# Patient Record
Sex: FEMALE | Race: WHITE | NOT HISPANIC OR LATINO | Employment: OTHER | URBAN - METROPOLITAN AREA
[De-identification: names, ages, dates, MRNs, and addresses within clinical notes are randomized per-mention and may not be internally consistent; named-entity substitution may affect disease eponyms.]

---

## 2017-06-07 ENCOUNTER — DOCUMENTATION ONLY (OUTPATIENT)
Dept: OTHER | Facility: CLINIC | Age: 67
End: 2017-06-07

## 2017-06-07 DIAGNOSIS — Z71.89 ACP (ADVANCE CARE PLANNING): Chronic | ICD-10-CM

## 2021-08-21 ENCOUNTER — HOSPITAL ENCOUNTER (EMERGENCY)
Facility: CLINIC | Age: 71
Discharge: HOME | End: 2021-08-21
Attending: PHYSICIAN ASSISTANT | Admitting: PHYSICIAN ASSISTANT
Payer: COMMERCIAL

## 2021-08-21 VITALS
TEMPERATURE: 96.7 F | SYSTOLIC BLOOD PRESSURE: 154 MMHG | OXYGEN SATURATION: 99 % | RESPIRATION RATE: 16 BRPM | DIASTOLIC BLOOD PRESSURE: 75 MMHG | HEART RATE: 65 BPM
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
## 2021-08-21 DIAGNOSIS — S60.569A INSECT BITE OF HAND, UNSPECIFIED LATERALITY, INITIAL ENCOUNTER: ICD-10-CM

## 2021-08-21 PROCEDURE — 99283 EMERGENCY DEPT VISIT LOW MDM: CPT

## 2021-08-21 PROCEDURE — 250N000013 HC RX MED GY IP 250 OP 250 PS 637: Performed by: EMERGENCY MEDICINE

## 2021-08-21 RX ORDER — DIPHENHYDRAMINE HCL 25 MG
50 CAPSULE ORAL ONCE
Status: COMPLETED | OUTPATIENT
Start: 2021-08-21 | End: 2021-08-21

## 2021-08-21 RX ORDER — FAMOTIDINE 20 MG/1
20 TABLET, FILM COATED ORAL ONCE
Status: COMPLETED | OUTPATIENT
Start: 2021-08-21 | End: 2021-08-21

## 2021-08-21 RX ADMIN — FAMOTIDINE 20 MG: 20 TABLET ORAL at 19:55

## 2021-08-21 RX ADMIN — DIPHENHYDRAMINE HYDROCHLORIDE 50 MG: 25 CAPSULE ORAL at 19:55

## 2021-08-22 ASSESSMENT — ENCOUNTER SYMPTOMS: CHEST TIGHTNESS: 1

## 2021-08-22 NOTE — ED TRIAGE NOTES
"Pt stung x 2 by bees 30 min pta.  Small amounts of localized erythema to B hands.  Pt states she feels \"tight\" in her chest.   Hx/o asthma.  Resp even and unlabored in triage.    "

## 2021-08-22 NOTE — ED PROVIDER NOTES
History     Chief Complaint:  Insect Bite and Allergic Reaction       HPI   Beena De Souza is a 71 year old female who presents to the ED for evaluation following an insect bite. The patient reports that she was gardening earlier today when a bee stung her right hand through a gardening glove. Immediately thereafter, another bee stung her left hand through her other gardening glove. She states that she took her gloves off. Some time later, she then noticed persistent swelling to her left hand, prompting presentation to the ED. Upon arrival to the ED, patient did have an episode where she felt tight in the chest, although she notes that she has asthma and this does occur. This has resolved on my evaluation.    Allergies:  Fluticasone     Medications:    Zoloft  Trazodone  Effexor  Flexeril  Albuterol  Lipitor    Past Medical History:    Asthma  Adjustment disorder     Past Surgical History:    C section  Breast biopsy     Social History:  Here with     PCP: [facility], [facility]     Review of Systems   Respiratory: Positive for chest tightness.    Skin: Positive for rash.   All other systems reviewed and are negative.        Physical Exam     Patient Vitals for the past 24 hrs:   BP Temp Pulse Resp SpO2   08/21/21 1950 (!) 154/75 (!) 96.7  F (35.9  C) 65 16 99 %        Physical Exam  Constitutional: Pleasant. Cooperative.  Eyes: Pupils equally round  HENT: Head is normal in appearance. Oropharynx is normal with moist mucus membranes.  Cardiovascular: Regular rate and rhythm without murmurs.  Respiratory: Normal respiratory effort, lungs clear to auscultation  Musculoskeletal: Full ROM of bilateral upper extremities. <2 sec cap refill throughout.  Skin: Erythematous papule noted to dorsum of R metacarpal. Erythematous papule noted to dorsum of left hand with trace surrounding edema.  Neurologic: Cranial nerves grossly intact, normal cognition, no apparent deficits. Sensation intact to bilateral  upper extremities.  Psychiatric: Normal affect.  Nursing notes and vital signs reviewed.    Emergency Department Course     Interventions:  Medications   diphenhydrAMINE (BENADRYL) capsule 50 mg (50 mg Oral Given 8/21/21 1955)   famotidine (PEPCID) tablet 20 mg (20 mg Oral Given 8/21/21 1955)        Emergency Department Course:  Patient provided the above medications prior to my evaluation.  Past medical records, nursing notes, and vitals reviewed.  I performed an exam of the patient and obtained history, as documented above.  Patient discharged to home.    Impression & Plan      Medical Decision Making:  Beena De Souza is a 71 year old female who presents to the ED for evaluation following two bee stings. No history of anaphylaxis. Patient is concerned given mild edema surrounding one of the sting sites. Patient did have episode of chest tightness upon arrival to the ED, however this resolved spontaneously without intervention. See HPI as above for additional details. Vitals and physical exam as above. No evidence for cellulitis. Discussed with patient that mild edema and tenderness surrounding bee sting is normal. Stinger may still be present, however there was no evidence for this, and we discussed that this would fall out spontaneously in the near future. In the meantime, she can use benadryl, ice for symptomatic relief. No concern for anaphylaxis at this time. Patient reassured. [place] patient was safe for discharge to home. Discussed reasons to return. All questions answered. Patient discharged to home in stable condition.    Diagnosis:    ICD-10-CM    1. Insect bite of hand, unspecified laterality, initial encounter  S60.569A     W57.XXXA         Discharge Medications:     Medication List      There are no discharge medications for this visit.          This record was created at least in part using electronic voice recognition software, so please excuse any typographical errors.         Blair Wilson,  VEGA  08/22/21 0216

## 2021-08-22 NOTE — DISCHARGE INSTRUCTIONS
Use Benadryl as per the directions on the packaging for symptomatic relief of any itching.  You may apply ice to the region to pain relief, as well as Tylenol.  Watch the area closely for any spreading redness around the wounds, streaking redness up the arms, or fevers.

## 2022-07-19 ENCOUNTER — PATIENT OUTREACH (OUTPATIENT)
Dept: ONCOLOGY | Facility: CLINIC | Age: 72
End: 2022-07-19

## 2022-07-19 ENCOUNTER — TRANSCRIBE ORDERS (OUTPATIENT)
Dept: OTHER | Age: 72
End: 2022-07-19

## 2022-07-19 DIAGNOSIS — D64.9 LOW HEMOGLOBIN: Primary | ICD-10-CM

## 2022-07-19 NOTE — PROGRESS NOTES
Writer received referral for bicytopenia. Reviewed for urgency based on labs and symptomology. Appropriate scheduling instructions added and referral sent to New Patient Scheduling for completion.

## 2022-09-14 ENCOUNTER — PRE VISIT (OUTPATIENT)
Dept: ONCOLOGY | Facility: CLINIC | Age: 72
End: 2022-09-14

## 2022-09-19 NOTE — PROGRESS NOTES
[facility]    Hematology/Oncology New Patient Note      Today's Date: 22    Reason for Consultation: Low hemoglobin  Referring Provider: Esteban Mejia MD, Tim Osman MD      HISTORY OF PRESENT ILLNESS: Beena De Souza is a  postemenopausal 72 year old female who presents with iron deficiency anemia. Past medical history is significant for HLD, asthma, and IBS.    Patient has been donating blood once monthly for a long while. She was then told she had elevated platelets and began to donate platelets only. Finally, she states she was told she was anemic and was no longer able to donate blood products.  Last blood donation was approximately 4 months ago.     She had noted decreased strength and energy. Workup in 2022 had returned with ferritin 13 and hemoglobin 11.9.    Patient denies evidence of gross bleed in urine, stools, or vaginal bleeding. She denies early satiety, abdominal bloating/pain.     She has been on oral iron once daily since July and has noted increased energy levels.     No personal history of malignancy. No FamHx of malignancy.    She has remote social smoking history in her 30s. No alcohol or illicit drug use. She is retired and lives at home with her  and puppy and is able to perform all ADLs without issue.    Cancer screening: Colonoscopies UTD, last 2-3 years. She has had polyp removal. Mammograms are UTD; she had a right breast biopsy >10 years, benign. Pap smears UTD- no abnormals.       REVIEW OF SYSTEMS:   A 14 point ROS was reviewed with pertinent positives and negatives in the HPI.        HOME MEDICATIONS:  No current outpatient medications on file.         ALLERGIES:  Allergies   Allergen Reactions     Fluticasone Rash         PAST MEDICAL HISTORY:  Past medical history is significant for HLD, asthma, and IBS.      PAST SURGICAL HISTORY:  Colonoscopies, breast biopsy.    SOCIAL HISTORY:  Social History     Socioeconomic History      "Marital status:      Spouse name: Not on file     Number of children: Not on file     Years of education: Not on file     Highest education level: Not on file   Occupational History     Not on file   Tobacco Use     Smoking status: Not on file     Smokeless tobacco: Not on file   Substance and Sexual Activity     Alcohol use: Not on file     Drug use: Not on file     Sexual activity: Not on file   Other Topics Concern     Not on file   Social History Narrative     Not on file     Social Determinants of Health     Financial Resource Strain: Not on file   Food Insecurity: Not on file   Transportation Needs: Not on file   Physical Activity: Not on file   Stress: Not on file   Social Connections: Not on file   Intimate Partner Violence: Not on file   Housing Stability: Not on file         FAMILY HISTORY:  Father: CAD, CVA.  No malignancy.    PHYSICAL EXAM:  Vital signs:  /63   Pulse 80   Temp 97.2  F (36.2  C) (Tympanic)   Resp 16   Ht 1.613 m (5' 3.5\")   Wt 53.4 kg (117 lb 12.8 oz)   SpO2 99%   BMI 20.54 kg/m     ECO  GENERAL/CONSTITUTIONAL: No acute distress. Thin.  EYES: Pupils are equal, round, and react to light and accommodation. Extraocular movements intact.  No scleral icterus.  ENT/MOUTH: Neck supple. Oropharynx clear, no mucositis.  LYMPH: No anterior cervical, posterior cervical, supraclavicular, axillary or inguinal adenopathy.   RESPIRATORY: Clear to auscultation bilaterally. No crackles or wheezing.   CARDIOVASCULAR: Regular rate and rhythm without murmurs, gallops, or rubs.  GASTROINTESTINAL: No hepatosplenomegaly, masses, or tenderness. The patient has normal bowel sounds. No guarding.  No distention.  MUSCULOSKELETAL: Warm and well-perfused, no cyanosis, clubbing, or edema.  NEUROLOGIC: Cranial nerves II-XII are intact. Alert, oriented, answers questions appropriately.  INTEGUMENTARY: No rashes or jaundice.  GAIT: Steady, does not use assistive device.      LABS:  ANEMIA - IRON " STUDIES  Component 2022        Iron 46 Low     --   TIBC -- --   % Saturation, calc. -- --   TIBC, Calculated 403 --   Transferrin 322 --   Ferritin -- 13   Vitamin B12 -- --   Folate -- --   % Saturation, Calculated 11 --   TIBC Interpretation Low iron, normal TIBC, possible iron deficiency. --       CHEM COMMON  Component 2022       Hours Fasting N/A   Creatinine 1.11 High       GFR, Estimated 54 Low       GFR, Est., If Black --   BUN 15   Sodium 140   Potassium 5.1   Chloride 106   CO2 29   Calcium 9.4   Anion Gap (calc.) --   Anion Gap 5 Low             HEME BLOOD  Component 2022       WBC 4.3   RBC 4.85   Hemoglobin 11.9 Low       HCT 38.0   MCV 78.4 Low       MCH 24.5 Low       MCHC 31.3 Low       RDW 18.3 High       Platelets 140 Low       ESR --   PMN/Band --   Lymph --   Mono --   Eos --   Baso --   Neutrophil Absolute 2.7   Lymph Absolute --   Mono  Absolute --   Eos   Absolute --   Baso  Absolute --   Retic, Automated --   Immature Gran --   Imm Gran Absolute --   Lymphocyte Absolute 1.1   Monocytes Absolute 0.3   Eosinophil Absolute 0.2   Basophil Absolute 0.0   Immature Gran % 0.0       HEPATITIS  Component 2022       Hepatitis C Antibody Negative (Non Reactive)     LIVER STUDIES  Component 2022       AST (SGOT) 24   ALT (SGPT) 18   Alkaline Phosphatase 56   Bilirubin, Total 0.4   Bilirubin, Direct --       PROTEINS  Component 2022       Protein, Total 6.7   Albumin 4.0         ASSESSMENT/PLAN:  Beena De Souza is a  postemenopausal 72 year old female who presents with iron deficiency anemia. Past medical history is significant for HLD, asthma, and IBS.    1) Iron deficiency anemia  -Patient denies gross evidence of bleed. She reports colonoscopy is UTD and last done 2-3 years ago.   -She previously was donating blood once monthly. I discussed with patient it sounds as if she was beginning to show symptoms of iron deficiency anemia with reactive  thrombocytosis and then subsequent laboratory evidence of anemia in July 2022.  -She continues on once daily iron tablet as well as VitC. Okay to continue. Discussed it can take 6-9 months to replenish stores.  -She is recommended to hold from blood donation at this time.  -Update CBC, CMP, iron stores, B12/folate, LDH, peripheral smear.   -Will see if she requires IV iron.   -Discussed with patient, if she has continued iron deficiency, it would be in her best interests to have repeat GI evaluation.     2) History of HLD, asthma, IBS  -Followed by PCP.     3) Follow up in 3-4 weeks to discuss results above.         Darlene Carpio, DO  Hematology/Oncology  [facility]

## 2022-09-23 ENCOUNTER — ONCOLOGY VISIT (OUTPATIENT)
Dept: ONCOLOGY | Facility: CLINIC | Age: 72
End: 2022-09-23
Attending: INTERNAL MEDICINE
Payer: COMMERCIAL

## 2022-09-23 VITALS
RESPIRATION RATE: 16 BRPM | SYSTOLIC BLOOD PRESSURE: 111 MMHG | WEIGHT: 117.8 LBS | HEART RATE: 80 BPM | BODY MASS INDEX: 20.11 KG/M2 | OXYGEN SATURATION: 99 % | HEIGHT: 64 IN | TEMPERATURE: 97.2 F | DIASTOLIC BLOOD PRESSURE: 63 MMHG

## 2022-09-23 DIAGNOSIS — D50.8 OTHER IRON DEFICIENCY ANEMIA: Primary | ICD-10-CM

## 2022-09-23 DIAGNOSIS — D64.9 LOW HEMOGLOBIN: ICD-10-CM

## 2022-09-23 LAB
ALBUMIN SERPL BCG-MCNC: 4.2 G/DL (ref 3.5–5.2)
ALP SERPL-CCNC: 66 U/L (ref 35–104)
ALT SERPL W P-5'-P-CCNC: 32 U/L (ref 10–35)
ANION GAP SERPL CALCULATED.3IONS-SCNC: 7 MMOL/L (ref 7–15)
AST SERPL W P-5'-P-CCNC: 36 U/L (ref 10–35)
BASOPHILS # BLD AUTO: 0 10E3/UL (ref 0–0.2)
BASOPHILS NFR BLD AUTO: 1 %
BILIRUB SERPL-MCNC: 0.5 MG/DL
BUN SERPL-MCNC: 13.2 MG/DL (ref 8–23)
CALCIUM SERPL-MCNC: 9.6 MG/DL (ref 8.8–10.2)
CHLORIDE SERPL-SCNC: 104 MMOL/L (ref 98–107)
CREAT SERPL-MCNC: 1.11 MG/DL (ref 0.51–0.95)
DEPRECATED HCO3 PLAS-SCNC: 29 MMOL/L (ref 22–29)
EOSINOPHIL # BLD AUTO: 0.5 10E3/UL (ref 0–0.7)
EOSINOPHIL NFR BLD AUTO: 8 %
ERYTHROCYTE [DISTWIDTH] IN BLOOD BY AUTOMATED COUNT: 15.7 % (ref 10–15)
FERRITIN SERPL-MCNC: 58 NG/ML (ref 11–328)
FOLATE SERPL-MCNC: 15.2 NG/ML (ref 4.6–34.8)
GFR SERPL CREATININE-BSD FRML MDRD: 53 ML/MIN/1.73M2
GLUCOSE SERPL-MCNC: 85 MG/DL (ref 70–99)
HCT VFR BLD AUTO: 43.8 % (ref 35–47)
HGB BLD-MCNC: 13.7 G/DL (ref 11.7–15.7)
IMM GRANULOCYTES # BLD: 0 10E3/UL
IMM GRANULOCYTES NFR BLD: 0 %
IRON BINDING CAPACITY (ROCHE): 314 UG/DL (ref 240–430)
IRON SATN MFR SERPL: 33 % (ref 15–46)
IRON SERPL-MCNC: 105 UG/DL (ref 37–145)
LYMPHOCYTES # BLD AUTO: 1.3 10E3/UL (ref 0.8–5.3)
LYMPHOCYTES NFR BLD AUTO: 23 %
MCH RBC QN AUTO: 27.9 PG (ref 26.5–33)
MCHC RBC AUTO-ENTMCNC: 31.3 G/DL (ref 31.5–36.5)
MCV RBC AUTO: 89 FL (ref 78–100)
MONOCYTES # BLD AUTO: 0.3 10E3/UL (ref 0–1.3)
MONOCYTES NFR BLD AUTO: 6 %
NEUTROPHILS # BLD AUTO: 3.4 10E3/UL (ref 1.6–8.3)
NEUTROPHILS NFR BLD AUTO: 62 %
NRBC # BLD AUTO: 0 10E3/UL
NRBC BLD AUTO-RTO: 0 /100
PLATELET # BLD AUTO: 153 10E3/UL (ref 150–450)
POTASSIUM SERPL-SCNC: 4.6 MMOL/L (ref 3.4–5.3)
PROT SERPL-MCNC: 7 G/DL (ref 6.4–8.3)
RBC # BLD AUTO: 4.91 10E6/UL (ref 3.8–5.2)
RETICS # AUTO: 0.05 10E6/UL (ref 0.03–0.1)
RETICS/RBC NFR AUTO: 0.9 % (ref 0.5–2)
SODIUM SERPL-SCNC: 140 MMOL/L (ref 136–145)
TSH SERPL DL<=0.005 MIU/L-ACNC: 2.11 UIU/ML (ref 0.3–4.2)
VIT B12 SERPL-MCNC: 784 PG/ML (ref 232–1245)
WBC # BLD AUTO: 5.5 10E3/UL (ref 4–11)

## 2022-09-23 PROCEDURE — 36415 COLL VENOUS BLD VENIPUNCTURE: CPT | Performed by: INTERNAL MEDICINE

## 2022-09-23 PROCEDURE — 84443 ASSAY THYROID STIM HORMONE: CPT | Performed by: INTERNAL MEDICINE

## 2022-09-23 PROCEDURE — 82746 ASSAY OF FOLIC ACID SERUM: CPT | Performed by: INTERNAL MEDICINE

## 2022-09-23 PROCEDURE — 82607 VITAMIN B-12: CPT | Performed by: INTERNAL MEDICINE

## 2022-09-23 PROCEDURE — 82728 ASSAY OF FERRITIN: CPT | Performed by: INTERNAL MEDICINE

## 2022-09-23 PROCEDURE — 83550 IRON BINDING TEST: CPT | Performed by: INTERNAL MEDICINE

## 2022-09-23 PROCEDURE — G0463 HOSPITAL OUTPT CLINIC VISIT: HCPCS | Mod: 25

## 2022-09-23 PROCEDURE — 85045 AUTOMATED RETICULOCYTE COUNT: CPT | Performed by: INTERNAL MEDICINE

## 2022-09-23 PROCEDURE — 99203 OFFICE O/P NEW LOW 30 MIN: CPT | Performed by: INTERNAL MEDICINE

## 2022-09-23 PROCEDURE — 85025 COMPLETE CBC W/AUTO DIFF WBC: CPT | Performed by: INTERNAL MEDICINE

## 2022-09-23 PROCEDURE — 80053 COMPREHEN METABOLIC PANEL: CPT | Performed by: INTERNAL MEDICINE

## 2022-09-23 RX ORDER — FLUOXETINE 10 MG/1
20 TABLET, FILM COATED ORAL DAILY
COMMUNITY
Start: 2022-09-03

## 2022-09-23 RX ORDER — ATORVASTATIN CALCIUM 10 MG/1
10 TABLET, FILM COATED ORAL DAILY
COMMUNITY
Start: 2022-08-26

## 2022-09-23 RX ORDER — TRAZODONE HYDROCHLORIDE 50 MG/1
TABLET, FILM COATED ORAL
COMMUNITY
Start: 2022-09-21

## 2022-09-23 RX ORDER — BUSPIRONE HYDROCHLORIDE 10 MG/1
TABLET ORAL
COMMUNITY
Start: 2022-09-21

## 2022-09-23 ASSESSMENT — PAIN SCALES - GENERAL: PAINLEVEL: NO PAIN (0)

## 2022-09-23 NOTE — LETTER
2022         RE: Beena De Souza  [address] [address] [address]        Dear Colleague,    Thank you for referring your patient, Beena De Souza, to the [facility]. Please see a copy of my visit note below.    [facility]    Hematology/Oncology New Patient Note      Today's Date: 22    Reason for Consultation: Low hemoglobin  Referring Provider: Esteban Mejia MD, Tim Osman MD      HISTORY OF PRESENT ILLNESS: Beena De Souza is a  postemenopausal 72 year old female who presents with iron deficiency anemia. Past medical history is significant for HLD, asthma, and IBS.    Patient has been donating blood once monthly for a long while. She was then told she had elevated platelets and began to donate platelets only. Finally, she states she was told she was anemic and was no longer able to donate blood products.  Last blood donation was approximately 4 months ago.     She had noted decreased strength and energy. Workup in 2022 had returned with ferritin 13 and hemoglobin 11.9.    Patient denies evidence of gross bleed in urine, stools, or vaginal bleeding. She denies early satiety, abdominal bloating/pain.     She has been on oral iron once daily since July and has noted increased energy levels.     No personal history of malignancy. No FamHx of malignancy.    She has remote social smoking history in her 30s. No alcohol or illicit drug use. She is retired and lives at home with her  and puppy and is able to perform all ADLs without issue.    Cancer screening: Colonoscopies UTD, last 2-3 years. She has had polyp removal. Mammograms are UTD; she had a right breast biopsy >10 years, benign. Pap smears UTD- no abnormals.       REVIEW OF SYSTEMS:   A 14 point ROS was reviewed with pertinent positives and negatives in the HPI.        HOME MEDICATIONS:  No current outpatient medications on file.         ALLERGIES:  Allergies  "  Allergen Reactions     Fluticasone Rash         PAST MEDICAL HISTORY:  Past medical history is significant for HLD, asthma, and IBS.      PAST SURGICAL HISTORY:  Colonoscopies, breast biopsy.    SOCIAL HISTORY:  Social History     Socioeconomic History     Marital status:      Spouse name: Not on file     Number of children: Not on file     Years of education: Not on file     Highest education level: Not on file   Occupational History     Not on file   Tobacco Use     Smoking status: Not on file     Smokeless tobacco: Not on file   Substance and Sexual Activity     Alcohol use: Not on file     Drug use: Not on file     Sexual activity: Not on file   Other Topics Concern     Not on file   Social History Narrative     Not on file     Social Determinants of Health     Financial Resource Strain: Not on file   Food Insecurity: Not on file   Transportation Needs: Not on file   Physical Activity: Not on file   Stress: Not on file   Social Connections: Not on file   Intimate Partner Violence: Not on file   Housing Stability: Not on file         FAMILY HISTORY:  Father: CAD, CVA.  No malignancy.    PHYSICAL EXAM:  Vital signs:  /63   Pulse 80   Temp 97.2  F (36.2  C) (Tympanic)   Resp 16   Ht 1.613 m (5' 3.5\")   Wt 53.4 kg (117 lb 12.8 oz)   SpO2 99%   BMI 20.54 kg/m     ECO  GENERAL/CONSTITUTIONAL: No acute distress. Thin.  EYES: Pupils are equal, round, and react to light and accommodation. Extraocular movements intact.  No scleral icterus.  ENT/MOUTH: Neck supple. Oropharynx clear, no mucositis.  LYMPH: No anterior cervical, posterior cervical, supraclavicular, axillary or inguinal adenopathy.   RESPIRATORY: Clear to auscultation bilaterally. No crackles or wheezing.   CARDIOVASCULAR: Regular rate and rhythm without murmurs, gallops, or rubs.  GASTROINTESTINAL: No hepatosplenomegaly, masses, or tenderness. The patient has normal bowel sounds. No guarding.  No distention.  MUSCULOSKELETAL: Warm " and well-perfused, no cyanosis, clubbing, or edema.  NEUROLOGIC: Cranial nerves II-XII are intact. Alert, oriented, answers questions appropriately.  INTEGUMENTARY: No rashes or jaundice.  GAIT: Steady, does not use assistive device.      LABS:  ANEMIA - IRON STUDIES  Component 2022        Iron 46 Low     --   TIBC -- --   % Saturation, calc. -- --   TIBC, Calculated 403 --   Transferrin 322 --   Ferritin -- 13   Vitamin B12 -- --   Folate -- --   % Saturation, Calculated 11 --   TIBC Interpretation Low iron, normal TIBC, possible iron deficiency. --       CHEM COMMON  Component 2022       Hours Fasting N/A   Creatinine 1.11 High       GFR, Estimated 54 Low       GFR, Est., If Black --   BUN 15   Sodium 140   Potassium 5.1   Chloride 106   CO2 29   Calcium 9.4   Anion Gap (calc.) --   Anion Gap 5 Low             HEME BLOOD  Component 2022       WBC 4.3   RBC 4.85   Hemoglobin 11.9 Low       HCT 38.0   MCV 78.4 Low       MCH 24.5 Low       MCHC 31.3 Low       RDW 18.3 High       Platelets 140 Low       ESR --   PMN/Band --   Lymph --   Mono --   Eos --   Baso --   Neutrophil Absolute 2.7   Lymph Absolute --   Mono  Absolute --   Eos   Absolute --   Baso  Absolute --   Retic, Automated --   Immature Gran --   Imm Gran Absolute --   Lymphocyte Absolute 1.1   Monocytes Absolute 0.3   Eosinophil Absolute 0.2   Basophil Absolute 0.0   Immature Gran % 0.0       HEPATITIS  Component 2022       Hepatitis C Antibody Negative (Non Reactive)     LIVER STUDIES  Component 2022       AST (SGOT) 24   ALT (SGPT) 18   Alkaline Phosphatase 56   Bilirubin, Total 0.4   Bilirubin, Direct --       PROTEINS  Component 2022       Protein, Total 6.7   Albumin 4.0         ASSESSMENT/PLAN:  Beena De Souza is a  postemenopausal 72 year old female who presents with iron deficiency anemia. Past medical history is significant for HLD, asthma, and IBS.    1) Iron deficiency anemia  -Patient denies  gross evidence of bleed. She reports colonoscopy is UTD and last done 2-3 years ago.   -She previously was donating blood once monthly. I discussed with patient it sounds as if she was beginning to show symptoms of iron deficiency anemia with reactive thrombocytosis and then subsequent laboratory evidence of anemia in July 2022.  -She continues on once daily iron tablet as well as VitC. Okay to continue. Discussed it can take 6-9 months to replenish stores.  -She is recommended to hold from blood donation at this time.  -Update CBC, CMP, iron stores, B12/folate, LDH, peripheral smear.   -Will see if she requires IV iron.   -Discussed with patient, if she has continued iron deficiency, it would be in her best interests to have repeat GI evaluation.     2) History of HLD, asthma, IBS  -Followed by PCP.     3) Follow up in 3-4 weeks to discuss results above.         Darlene Carpio DO  Hematology/Oncology  [facility]        Again, thank you for allowing me to participate in the care of your patient.        Sincerely,        Darlene Carpio DO

## 2022-09-23 NOTE — LETTER
2022         RE: Beena De Souza  [id number] [facility] [id number]      [facility]    Hematology/Oncology New Patient Note      Today's Date: 22    Reason for Consultation: Low hemoglobin  Referring Provider: Esteban Mejia MD, Tim Osman MD      HISTORY OF PRESENT ILLNESS: Beena De Souza is a  postemenopausal 72 year old female who presents with iron deficiency anemia. Past medical history is significant for HLD, asthma, and IBS.    Patient has been donating blood once monthly for a long while. She was then told she had elevated platelets and began to donate platelets only. Finally, she states she was told she was anemic and was no longer able to donate blood products.  Last blood donation was approximately 4 months ago.     She had noted decreased strength and energy. Workup in 2022 had returned with ferritin 13 and hemoglobin 11.9.    Patient denies evidence of gross bleed in urine, stools, or vaginal bleeding. She denies early satiety, abdominal bloating/pain.     She has been on oral iron once daily since July and has noted increased energy levels.     No personal history of malignancy. No FamHx of malignancy.    She has remote social smoking history in her 30s. No alcohol or illicit drug use. She is retired and lives at home with her  and puppy and is able to perform all ADLs without issue.    Cancer screening: Colonoscopies UTD, last 2-3 years. She has had polyp removal. Mammograms are UTD; she had a right breast biopsy >10 years, benign. Pap smears UTD- no abnormals.       REVIEW OF SYSTEMS:   A 14 point ROS was reviewed with pertinent positives and negatives in the HPI.        HOME MEDICATIONS:  No current outpatient medications on file.         ALLERGIES:  Allergies   Allergen Reactions     Fluticasone Rash         PAST MEDICAL HISTORY:  Past medical history is significant for HLD, asthma, and IBS.      PAST SURGICAL HISTORY:  Colonoscopies,  "breast biopsy.    SOCIAL HISTORY:  Social History     Socioeconomic History     Marital status:      Spouse name: Not on file     Number of children: Not on file     Years of education: Not on file     Highest education level: Not on file   Occupational History     Not on file   Tobacco Use     Smoking status: Not on file     Smokeless tobacco: Not on file   Substance and Sexual Activity     Alcohol use: Not on file     Drug use: Not on file     Sexual activity: Not on file   Other Topics Concern     Not on file   Social History Narrative     Not on file     Social Determinants of Health     Financial Resource Strain: Not on file   Food Insecurity: Not on file   Transportation Needs: Not on file   Physical Activity: Not on file   Stress: Not on file   Social Connections: Not on file   Intimate Partner Violence: Not on file   Housing Stability: Not on file         FAMILY HISTORY:  Father: CAD, CVA.  No malignancy.    PHYSICAL EXAM:  Vital signs:  /63   Pulse 80   Temp 97.2  F (36.2  C) (Tympanic)   Resp 16   Ht 1.613 m (5' 3.5\")   Wt 53.4 kg (117 lb 12.8 oz)   SpO2 99%   BMI 20.54 kg/m     ECO  GENERAL/CONSTITUTIONAL: No acute distress. Thin.  EYES: Pupils are equal, round, and react to light and accommodation. Extraocular movements intact.  No scleral icterus.  ENT/MOUTH: Neck supple. Oropharynx clear, no mucositis.  LYMPH: No anterior cervical, posterior cervical, supraclavicular, axillary or inguinal adenopathy.   RESPIRATORY: Clear to auscultation bilaterally. No crackles or wheezing.   CARDIOVASCULAR: Regular rate and rhythm without murmurs, gallops, or rubs.  GASTROINTESTINAL: No hepatosplenomegaly, masses, or tenderness. The patient has normal bowel sounds. No guarding.  No distention.  MUSCULOSKELETAL: Warm and well-perfused, no cyanosis, clubbing, or edema.  NEUROLOGIC: Cranial nerves II-XII are intact. Alert, oriented, answers questions appropriately.  INTEGUMENTARY: No rashes or " jaundice.  GAIT: Steady, does not use assistive device.      LABS:  ANEMIA - IRON STUDIES  Component 2022        Iron 46 Low     --   TIBC -- --   % Saturation, calc. -- --   TIBC, Calculated 403 --   Transferrin 322 --   Ferritin -- 13   Vitamin B12 -- --   Folate -- --   % Saturation, Calculated 11 --   TIBC Interpretation Low iron, normal TIBC, possible iron deficiency. --       CHEM COMMON  Component 2022       Hours Fasting N/A   Creatinine 1.11 High       GFR, Estimated 54 Low       GFR, Est., If Black --   BUN 15   Sodium 140   Potassium 5.1   Chloride 106   CO2 29   Calcium 9.4   Anion Gap (calc.) --   Anion Gap 5 Low             HEME BLOOD  Component 2022       WBC 4.3   RBC 4.85   Hemoglobin 11.9 Low       HCT 38.0   MCV 78.4 Low       MCH 24.5 Low       MCHC 31.3 Low       RDW 18.3 High       Platelets 140 Low       ESR --   PMN/Band --   Lymph --   Mono --   Eos --   Baso --   Neutrophil Absolute 2.7   Lymph Absolute --   Mono  Absolute --   Eos   Absolute --   Baso  Absolute --   Retic, Automated --   Immature Gran --   Imm Gran Absolute --   Lymphocyte Absolute 1.1   Monocytes Absolute 0.3   Eosinophil Absolute 0.2   Basophil Absolute 0.0   Immature Gran % 0.0       HEPATITIS  Component 2022       Hepatitis C Antibody Negative (Non Reactive)     LIVER STUDIES  Component 2022       AST (SGOT) 24   ALT (SGPT) 18   Alkaline Phosphatase 56   Bilirubin, Total 0.4   Bilirubin, Direct --       PROTEINS  Component 2022       Protein, Total 6.7   Albumin 4.0         ASSESSMENT/PLAN:  Beena De Souza is a  postemenopausal 72 year old female who presents with iron deficiency anemia. Past medical history is significant for HLD, asthma, and IBS.    1) Iron deficiency anemia  -Patient denies gross evidence of bleed. She reports colonoscopy is UTD and last done 2-3 years ago.   -She previously was donating blood once monthly. I discussed with patient it sounds as if  she was beginning to show symptoms of iron deficiency anemia with reactive thrombocytosis and then subsequent laboratory evidence of anemia in July 2022.  -She continues on once daily iron tablet as well as VitC. Okay to continue. Discussed it can take 6-9 months to replenish stores.  -She is recommended to hold from blood donation at this time.  -Update CBC, CMP, iron stores, B12/folate, LDH, peripheral smear.   -Will see if she requires IV iron.   -Discussed with patient, if she has continued iron deficiency, it would be in her best interests to have repeat GI evaluation.     2) History of HLD, asthma, IBS  -Followed by PCP.     3) Follow up in 3-4 weeks to discuss results above.         Darlene Carpio DO  Hematology/Oncology  [facility]          Darlene Carpio DO

## 2022-09-23 NOTE — PROGRESS NOTES
Medical Assistant Note:  Beena De Souza presents today for blood draw.    Patient seen by provider today: Yes: Dr. Carpio.   present during visit today: Not Applicable.    Concerns: No Concerns.    Procedure:  Labs drawn    Post Assessment:  Labs drawn without difficulty: Yes.    Discharge Plan:  Departure Mode: Ambulatory.    Face to Face Time: 10 min.    Gisel Fish CMA

## 2022-09-23 NOTE — NURSING NOTE
"Oncology Rooming Note    September 23, 2022 9:02 AM   Beena De Souza is a 72 year old female who presents for:    Chief Complaint   Patient presents with     Oncology Clinic Visit     New Patient      Initial Vitals: /63   Pulse 80   Temp 97.2  F (36.2  C) (Tympanic)   Resp 16   Ht 1.613 m (5' 3.5\")   Wt 53.4 kg (117 lb 12.8 oz)   SpO2 99%   BMI 20.54 kg/m   Estimated body mass index is 20.54 kg/m  as calculated from the following:    Height as of this encounter: 1.613 m (5' 3.5\").    Weight as of this encounter: 53.4 kg (117 lb 12.8 oz). Body surface area is 1.55 meters squared.  No Pain (0) Comment: Data Unavailable   No LMP recorded. Patient is postmenopausal.  Allergies reviewed: Yes  Medications reviewed: Yes    Medications: Medication refills not needed today.  Pharmacy name entered into [facility]: [id number] - [place], [place] - [address]    Clinical concerns: New Patient        Torie Garcia CMA              "

## 2022-09-27 LAB
PATH REPORT.COMMENTS IMP SPEC: NORMAL
PATH REPORT.FINAL DX SPEC: NORMAL
PATH REPORT.MICROSCOPIC SPEC OTHER STN: NORMAL
PATH REPORT.MICROSCOPIC SPEC OTHER STN: NORMAL
PATH REPORT.RELEVANT HX SPEC: NORMAL

## 2022-09-27 PROCEDURE — 85060 BLOOD SMEAR INTERPRETATION: CPT | Performed by: PATHOLOGY

## 2022-09-30 NOTE — PATIENT INSTRUCTIONS
Beena is scheduled for a follow up with Dr. Carpio on 10/25/22 at 11:30 am.    Malina Solis RN on 9/30/2022 at 9:18 AM

## 2022-10-24 NOTE — PROGRESS NOTES
[facility]    Hematology/Oncology Established Patient Note      Today's Date: 10/25/22    Reason for Consultation: Low hemoglobin  Referring Provider: Esteban Mejia MD, Tim Osman MD      HISTORY OF PRESENT ILLNESS: Beena De Souza is a  postemenopausal 72 year old female who presents with iron deficiency anemia. Past medical history is significant for HLD, asthma, and IBS.    Patient has been donating blood once monthly for a long while. She was then told she had elevated platelets and began to donate platelets only. Finally, she states she was told she was anemic and was no longer able to donate blood products.  Last blood donation was approximately 2022.     She had noted decreased strength and energy. Workup in 2022 had returned with ferritin 13 and hemoglobin 11.9.    Patient denies evidence of gross bleed in urine, stools, or vaginal bleeding. She denies early satiety, abdominal bloating/pain.     She has been on oral iron once daily since July and has noted increased energy levels.     No personal history of malignancy. No FamHx of malignancy.    She has remote social smoking history in her 30s. No alcohol or illicit drug use. She is retired and lives at home with her  and puppy and is able to perform all ADLs without issue.    Cancer screening: Colonoscopies UTD, last 2-3 years. She has had polyp removal. Mammograms are UTD; she had a right breast biopsy >10 years, benign. Pap smears UTD- no abnormals.       INTERIM HISTORY:        REVIEW OF SYSTEMS:   A 14 point ROS was reviewed with pertinent positives and negatives in the HPI.        HOME MEDICATIONS:  Current Outpatient Medications   Medication Sig Dispense Refill     atorvastatin (LIPITOR) 10 MG tablet Take 10 mg by mouth daily       busPIRone (BUSPAR) 10 MG tablet        FLUoxetine (PROZAC) 10 MG tablet Take 20 mg by mouth daily       traZODone (DESYREL) 50 MG tablet            ALLERGIES:  Allergies    Allergen Reactions     Fluticasone Rash         PAST MEDICAL HISTORY:  Past medical history is significant for HLD, asthma, and IBS.      PAST SURGICAL HISTORY:  Colonoscopies, breast biopsy.    SOCIAL HISTORY:  Social History     Socioeconomic History     Marital status:      Spouse name: Not on file     Number of children: Not on file     Years of education: Not on file     Highest education level: Not on file   Occupational History     Not on file   Tobacco Use     Smoking status: Former     Types: Cigarettes     Smokeless tobacco: Never   Substance and Sexual Activity     Alcohol use: Not on file     Comment: social     Drug use: Not on file     Sexual activity: Not on file   Other Topics Concern     Not on file   Social History Narrative     Not on file     Social Determinants of Health     Financial Resource Strain: Not on file   Food Insecurity: Not on file   Transportation Needs: Not on file   Physical Activity: Not on file   Stress: Not on file   Social Connections: Not on file   Intimate Partner Violence: Not At Risk     Fear of Current or Ex-Partner: No     Emotionally Abused: No     Physically Abused: No     Sexually Abused: No   Housing Stability: Not on file         FAMILY HISTORY:  Father: CAD, CVA.  No malignancy.    PHYSICAL EXAM:  Vital signs:  There were no vitals taken for this visit.   ECO  GENERAL/CONSTITUTIONAL: No acute distress. Thin.  EYES: Pupils are equal, round, and react to light and accommodation. Extraocular movements intact.  No scleral icterus.  ENT/MOUTH: Neck supple. Oropharynx clear, no mucositis.  LYMPH: No anterior cervical, posterior cervical, supraclavicular, axillary or inguinal adenopathy.   RESPIRATORY: Clear to auscultation bilaterally. No crackles or wheezing.   CARDIOVASCULAR: Regular rate and rhythm without murmurs, gallops, or rubs.  GASTROINTESTINAL: No hepatosplenomegaly, masses, or tenderness. The patient has normal bowel sounds. No guarding.  No  distention.  MUSCULOSKELETAL: Warm and well-perfused, no cyanosis, clubbing, or edema.  NEUROLOGIC: Cranial nerves II-XII are intact. Alert, oriented, answers questions appropriately.  INTEGUMENTARY: No rashes or jaundice.  GAIT: Steady, does not use assistive device.      LABS:   Latest Reference Range & Units 09/23/22 09:50   Sodium 136 - 145 mmol/L 140   Potassium 3.4 - 5.3 mmol/L 4.6   Chloride 98 - 107 mmol/L 104   Carbon Dioxide (CO2) 22 - 29 mmol/L 29   Urea Nitrogen 8.0 - 23.0 mg/dL 13.2   Creatinine 0.51 - 0.95 mg/dL 1.11 (H)   GFR Estimate >60 mL/min/1.73m2 53 (L)   Calcium 8.8 - 10.2 mg/dL 9.6   Anion Gap 7 - 15 mmol/L 7   Albumin 3.5 - 5.2 g/dL 4.2   Protein Total 6.4 - 8.3 g/dL 7.0   Alkaline Phosphatase 35 - 104 U/L 66   ALT 10 - 35 U/L 32   AST 10 - 35 U/L 36 (H)   Bilirubin Total <=1.2 mg/dL 0.5   Ferritin 11 - 328 ng/mL 58   Folate 4.6 - 34.8 ng/mL 15.2   Glucose 70 - 99 mg/dL 85   Iron 37 - 145 ug/dL 105   Iron Binding Capacity 240 - 430 ug/dL 314   Iron Sat Index 15 - 46 % 33   TSH 0.30 - 4.20 uIU/mL 2.11   Vitamin B12 232 - 1,245 pg/mL 784   WBC 4.0 - 11.0 10e3/uL 5.5   Hemoglobin 11.7 - 15.7 g/dL 13.7   Hematocrit 35.0 - 47.0 % 43.8   Platelet Count 150 - 450 10e3/uL 153   RBC Count 3.80 - 5.20 10e6/uL 4.91   MCV 78 - 100 fL 89   MCH 26.5 - 33.0 pg 27.9   MCHC 31.5 - 36.5 g/dL 31.3 (L)   RDW 10.0 - 15.0 % 15.7 (H)   % Neutrophils % 62   % Lymphocytes % 23   % Monocytes % 6   % Eosinophils % 8   % Basophils % 1   Absolute Basophils 0.0 - 0.2 10e3/uL 0.0   Absolute Eosinophils 0.0 - 0.7 10e3/uL 0.5   Absolute Immature Granulocytes <=0.4 10e3/uL 0.0   Absolute Lymphocytes 0.8 - 5.3 10e3/uL 1.3   Absolute Monocytes 0.0 - 1.3 10e3/uL 0.3   % Immature Granulocytes % 0   Absolute Neutrophils 1.6 - 8.3 10e3/uL 3.4   Absolute NRBCs 10e3/uL 0.0   NRBCs per 100 WBC <1 /100 0   % Retic 0.5 - 2.0 % 0.9   Absolute Retic 0.025 - 0.095 10e6/uL 0.046       Final Diagnosis 9/23/22:   Peripheral blood:  -- No  morphologic abnormalities.    Electronically signed by Patrick Navas MD on 2022 at 12:51 PM   Clinical Information    Anemia      Peripheral Smear    The red blood cells appear normochromic.  There is no increase in rouleaux formation or polychromasia.  Poikilocytosis appears mild and nonspecific.  Platelets appear predominantly small, well granulated, and lack significant clumping or satellitosis.  Lymphocytes are predominantly small with cytologically mature chromatin and appear overall polymorphous.  Neutrophils contain normal cytoplasmic granulation and unremarkable nuclear morphology.  There is no dysplasia and no circulating blasts are seen.     Findings 3/5/2018:      The perianal and digital rectal examinations were normal.      The colon (entire examined portion) appeared normal.      Retroflexion done in the right colon.      Non-bleeding external and internal hemorrhoids were found during       retroflexion. The hemorrhoids were medium-sized.  Moderate Sedation:      Moderate (conscious) sedation was personally administered by the       endoscopist. The following parameters were monitored: oxygen       saturation, heart rate, blood pressure, and response to care. Total       physician intraservice time was 10 minutes.  Impression:          - The entire examined colon is normal.                      - Retroflexion done in the right colon.                      - Non-bleeding external and internal hemorrhoids.                      - No specimens collected.  Recommendation:      - Repeat colonoscopy in 10 years for screening                       purposes.      ASSESSMENT/PLAN:  Beena De Souza is a  postemenopausal 72 year old female who presents with iron deficiency anemia. Past medical history is significant for HLD, asthma, and IBS.    1) Iron deficiency anemia  -Patient denies gross evidence of bleed. She reports colonoscopy is UTD and last done in 2018 (Dr. Milly Sanchez). Given the fact that  she does have evidence of iron deficiency that responded to oral supplementation, there should be strong consideration for updating GI workup at this time (EGD/colonoscopy). However, hemoglobin has improved to 13.7 and ferritin is up to 58 currently.  -She continues on once daily iron tablet as well as VitC. Okay to continue. Discussed it can take 6-9 months to replenish stores.  -She is recommended to hold from blood donation at this time.    2) History of HLD, asthma, IBS  -Followed by PCP.     3) CKD  -SPIEP testing done at outside institution was negative for M-protein.     4) Follow up in 6 months with repeat CBC and iron stores to monitor for need for IV iron/etc.         Darlene Carpio DO  Hematology/Oncology  [facility]

## 2022-10-25 ENCOUNTER — ONCOLOGY VISIT (OUTPATIENT)
Dept: ONCOLOGY | Facility: CLINIC | Age: 72
End: 2022-10-25
Attending: INTERNAL MEDICINE
Payer: COMMERCIAL

## 2022-10-25 VITALS
SYSTOLIC BLOOD PRESSURE: 99 MMHG | TEMPERATURE: 97.2 F | BODY MASS INDEX: 19.55 KG/M2 | DIASTOLIC BLOOD PRESSURE: 56 MMHG | HEART RATE: 75 BPM | RESPIRATION RATE: 16 BRPM | WEIGHT: 112.1 LBS | OXYGEN SATURATION: 94 %

## 2022-10-25 DIAGNOSIS — D50.8 OTHER IRON DEFICIENCY ANEMIA: ICD-10-CM

## 2022-10-25 PROCEDURE — 99214 OFFICE O/P EST MOD 30 MIN: CPT | Performed by: INTERNAL MEDICINE

## 2022-10-25 PROCEDURE — G0463 HOSPITAL OUTPT CLINIC VISIT: HCPCS

## 2022-10-25 ASSESSMENT — PAIN SCALES - GENERAL: PAINLEVEL: NO PAIN (0)

## 2022-10-25 NOTE — LETTER
10/25/2022         RE: Beena De Souza  [id number] [facility] [address]        Dear Colleague,    Thank you for referring your patient, Beena De Souza, to the [facility]. Please see a copy of my visit note below.    [facility]    Hematology/Oncology Established Patient Note      Today's Date: 10/25/22    Reason for Consultation: Low hemoglobin  Referring Provider: Esteban Mejia MD, Tim Osman MD      HISTORY OF PRESENT ILLNESS: Beena De Souza is a  postemenopausal 72 year old female who presents with iron deficiency anemia. Past medical history is significant for HLD, asthma, and IBS.    Patient has been donating blood once monthly for a long while. She was then told she had elevated platelets and began to donate platelets only. Finally, she states she was told she was anemic and was no longer able to donate blood products.  Last blood donation was approximately 2022.     She had noted decreased strength and energy. Workup in 2022 had returned with ferritin 13 and hemoglobin 11.9.    Patient denies evidence of gross bleed in urine, stools, or vaginal bleeding. She denies early satiety, abdominal bloating/pain.     She has been on oral iron once daily since July and has noted increased energy levels.     No personal history of malignancy. No FamHx of malignancy.    She has remote social smoking history in her 30s. No alcohol or illicit drug use. She is retired and lives at home with her  and puppy and is able to perform all ADLs without issue.    Cancer screening: Colonoscopies UTD, last 2-3 years. She has had polyp removal. Mammograms are UTD; she had a right breast biopsy >10 years, benign. Pap smears UTD- no abnormals.       INTERIM HISTORY:        REVIEW OF SYSTEMS:   A 14 point ROS was reviewed with pertinent positives and negatives in the HPI.        HOME MEDICATIONS:  Current Outpatient Medications   Medication Sig  Dispense Refill     atorvastatin (LIPITOR) 10 MG tablet Take 10 mg by mouth daily       busPIRone (BUSPAR) 10 MG tablet        FLUoxetine (PROZAC) 10 MG tablet Take 20 mg by mouth daily       traZODone (DESYREL) 50 MG tablet            ALLERGIES:  Allergies   Allergen Reactions     Fluticasone Rash         PAST MEDICAL HISTORY:  Past medical history is significant for HLD, asthma, and IBS.      PAST SURGICAL HISTORY:  Colonoscopies, breast biopsy.    SOCIAL HISTORY:  Social History     Socioeconomic History     Marital status:      Spouse name: Not on file     Number of children: Not on file     Years of education: Not on file     Highest education level: Not on file   Occupational History     Not on file   Tobacco Use     Smoking status: Former     Types: Cigarettes     Smokeless tobacco: Never   Substance and Sexual Activity     Alcohol use: Not on file     Comment: social     Drug use: Not on file     Sexual activity: Not on file   Other Topics Concern     Not on file   Social History Narrative     Not on file     Social Determinants of Health     Financial Resource Strain: Not on file   Food Insecurity: Not on file   Transportation Needs: Not on file   Physical Activity: Not on file   Stress: Not on file   Social Connections: Not on file   Intimate Partner Violence: Not At Risk     Fear of Current or Ex-Partner: No     Emotionally Abused: No     Physically Abused: No     Sexually Abused: No   Housing Stability: Not on file         FAMILY HISTORY:  Father: CAD, CVA.  No malignancy.    PHYSICAL EXAM:  Vital signs:  There were no vitals taken for this visit.   ECO  GENERAL/CONSTITUTIONAL: No acute distress. Thin.  EYES: Pupils are equal, round, and react to light and accommodation. Extraocular movements intact.  No scleral icterus.  ENT/MOUTH: Neck supple. Oropharynx clear, no mucositis.  LYMPH: No anterior cervical, posterior cervical, supraclavicular, axillary or inguinal adenopathy.   RESPIRATORY:  Clear to auscultation bilaterally. No crackles or wheezing.   CARDIOVASCULAR: Regular rate and rhythm without murmurs, gallops, or rubs.  GASTROINTESTINAL: No hepatosplenomegaly, masses, or tenderness. The patient has normal bowel sounds. No guarding.  No distention.  MUSCULOSKELETAL: Warm and well-perfused, no cyanosis, clubbing, or edema.  NEUROLOGIC: Cranial nerves II-XII are intact. Alert, oriented, answers questions appropriately.  INTEGUMENTARY: No rashes or jaundice.  GAIT: Steady, does not use assistive device.      LABS:   Latest Reference Range & Units 09/23/22 09:50   Sodium 136 - 145 mmol/L 140   Potassium 3.4 - 5.3 mmol/L 4.6   Chloride 98 - 107 mmol/L 104   Carbon Dioxide (CO2) 22 - 29 mmol/L 29   Urea Nitrogen 8.0 - 23.0 mg/dL 13.2   Creatinine 0.51 - 0.95 mg/dL 1.11 (H)   GFR Estimate >60 mL/min/1.73m2 53 (L)   Calcium 8.8 - 10.2 mg/dL 9.6   Anion Gap 7 - 15 mmol/L 7   Albumin 3.5 - 5.2 g/dL 4.2   Protein Total 6.4 - 8.3 g/dL 7.0   Alkaline Phosphatase 35 - 104 U/L 66   ALT 10 - 35 U/L 32   AST 10 - 35 U/L 36 (H)   Bilirubin Total <=1.2 mg/dL 0.5   Ferritin 11 - 328 ng/mL 58   Folate 4.6 - 34.8 ng/mL 15.2   Glucose 70 - 99 mg/dL 85   Iron 37 - 145 ug/dL 105   Iron Binding Capacity 240 - 430 ug/dL 314   Iron Sat Index 15 - 46 % 33   TSH 0.30 - 4.20 uIU/mL 2.11   Vitamin B12 232 - 1,245 pg/mL 784   WBC 4.0 - 11.0 10e3/uL 5.5   Hemoglobin 11.7 - 15.7 g/dL 13.7   Hematocrit 35.0 - 47.0 % 43.8   Platelet Count 150 - 450 10e3/uL 153   RBC Count 3.80 - 5.20 10e6/uL 4.91   MCV 78 - 100 fL 89   MCH 26.5 - 33.0 pg 27.9   MCHC 31.5 - 36.5 g/dL 31.3 (L)   RDW 10.0 - 15.0 % 15.7 (H)   % Neutrophils % 62   % Lymphocytes % 23   % Monocytes % 6   % Eosinophils % 8   % Basophils % 1   Absolute Basophils 0.0 - 0.2 10e3/uL 0.0   Absolute Eosinophils 0.0 - 0.7 10e3/uL 0.5   Absolute Immature Granulocytes <=0.4 10e3/uL 0.0   Absolute Lymphocytes 0.8 - 5.3 10e3/uL 1.3   Absolute Monocytes 0.0 - 1.3 10e3/uL 0.3   %  Immature Granulocytes % 0   Absolute Neutrophils 1.6 - 8.3 10e3/uL 3.4   Absolute NRBCs 10e3/uL 0.0   NRBCs per 100 WBC <1 /100 0   % Retic 0.5 - 2.0 % 0.9   Absolute Retic 0.025 - 0.095 10e6/uL 0.046       Final Diagnosis 22:   Peripheral blood:  -- No morphologic abnormalities.    Electronically signed by Patrick Navas MD on 2022 at 12:51 PM   Clinical Information    Anemia      Peripheral Smear    The red blood cells appear normochromic.  There is no increase in rouleaux formation or polychromasia.  Poikilocytosis appears mild and nonspecific.  Platelets appear predominantly small, well granulated, and lack significant clumping or satellitosis.  Lymphocytes are predominantly small with cytologically mature chromatin and appear overall polymorphous.  Neutrophils contain normal cytoplasmic granulation and unremarkable nuclear morphology.  There is no dysplasia and no circulating blasts are seen.     Findings 3/5/2018:      The perianal and digital rectal examinations were normal.      The colon (entire examined portion) appeared normal.      Retroflexion done in the right colon.      Non-bleeding external and internal hemorrhoids were found during       retroflexion. The hemorrhoids were medium-sized.  Moderate Sedation:      Moderate (conscious) sedation was personally administered by the       endoscopist. The following parameters were monitored: oxygen       saturation, heart rate, blood pressure, and response to care. Total       physician intraservice time was 10 minutes.  Impression:          - The entire examined colon is normal.                      - Retroflexion done in the right colon.                      - Non-bleeding external and internal hemorrhoids.                      - No specimens collected.  Recommendation:      - Repeat colonoscopy in 10 years for screening                       purposes.      ASSESSMENT/PLAN:  Beena De Souza is a  postemenopausal 72 year old female who  presents with iron deficiency anemia. Past medical history is significant for HLD, asthma, and IBS.    1) Iron deficiency anemia  -Patient denies gross evidence of bleed. She reports colonoscopy is UTD and last done in 2018 (Dr. Milly Sanchez). Given the fact that she does have evidence of iron deficiency that responded to oral supplementation, there should be strong consideration for updating GI workup at this time (EGD/colonoscopy). However, hemoglobin has improved to 13.7 and ferritin is up to 58 currently.  -She continues on once daily iron tablet as well as VitC. Okay to continue. Discussed it can take 6-9 months to replenish stores.  -She is recommended to hold from blood donation at this time.    2) History of HLD, asthma, IBS  -Followed by PCP.     3) CKD  -SPIEP testing done at outside institution was negative for M-protein.     4) Follow up in 6 months with repeat CBC and iron stores to monitor for need for IV iron/etc.         Darlene Carpio DO  Hematology/Oncology  [facility]        Again, thank you for allowing me to participate in the care of your patient.        Sincerely,        Darlene Carpio DO

## 2022-11-01 NOTE — PATIENT INSTRUCTIONS
All appointments scheduled per Dr. Carpio's checkout note.    Malina Solis RN on 11/1/2022 at 2:51 PM

## 2023-04-14 ENCOUNTER — LAB (OUTPATIENT)
Dept: ONCOLOGY | Facility: CLINIC | Age: 73
End: 2023-04-14
Attending: INTERNAL MEDICINE
Payer: COMMERCIAL

## 2023-04-14 DIAGNOSIS — D50.8 OTHER IRON DEFICIENCY ANEMIA: ICD-10-CM

## 2023-04-14 LAB
BASOPHILS # BLD AUTO: 0.1 10E3/UL (ref 0–0.2)
BASOPHILS NFR BLD AUTO: 1 %
EOSINOPHIL # BLD AUTO: 0.2 10E3/UL (ref 0–0.7)
EOSINOPHIL NFR BLD AUTO: 3 %
ERYTHROCYTE [DISTWIDTH] IN BLOOD BY AUTOMATED COUNT: 13.1 % (ref 10–15)
FERRITIN SERPL-MCNC: 82 NG/ML (ref 11–328)
HCT VFR BLD AUTO: 42.7 % (ref 35–47)
HGB BLD-MCNC: 14 G/DL (ref 11.7–15.7)
IMM GRANULOCYTES # BLD: 0 10E3/UL
IMM GRANULOCYTES NFR BLD: 0 %
IRON BINDING CAPACITY (ROCHE): 252 UG/DL (ref 240–430)
IRON SATN MFR SERPL: 38 % (ref 15–46)
IRON SERPL-MCNC: 95 UG/DL (ref 37–145)
LYMPHOCYTES # BLD AUTO: 1.3 10E3/UL (ref 0.8–5.3)
LYMPHOCYTES NFR BLD AUTO: 24 %
MCH RBC QN AUTO: 29.6 PG (ref 26.5–33)
MCHC RBC AUTO-ENTMCNC: 32.8 G/DL (ref 31.5–36.5)
MCV RBC AUTO: 90 FL (ref 78–100)
MONOCYTES # BLD AUTO: 0.3 10E3/UL (ref 0–1.3)
MONOCYTES NFR BLD AUTO: 5 %
NEUTROPHILS # BLD AUTO: 3.8 10E3/UL (ref 1.6–8.3)
NEUTROPHILS NFR BLD AUTO: 67 %
NRBC # BLD AUTO: 0 10E3/UL
NRBC BLD AUTO-RTO: 0 /100
PLATELET # BLD AUTO: 163 10E3/UL (ref 150–450)
RBC # BLD AUTO: 4.73 10E6/UL (ref 3.8–5.2)
WBC # BLD AUTO: 5.7 10E3/UL (ref 4–11)

## 2023-04-14 PROCEDURE — 83550 IRON BINDING TEST: CPT | Performed by: INTERNAL MEDICINE

## 2023-04-14 PROCEDURE — 36415 COLL VENOUS BLD VENIPUNCTURE: CPT

## 2023-04-14 PROCEDURE — 82728 ASSAY OF FERRITIN: CPT | Performed by: INTERNAL MEDICINE

## 2023-04-14 PROCEDURE — 85025 COMPLETE CBC W/AUTO DIFF WBC: CPT | Performed by: INTERNAL MEDICINE

## 2023-04-14 NOTE — PROGRESS NOTES
Medical Assistant Note:  Beena De Souza presents today for blood draw.    Patient seen by provider today: No.   present during visit today: Not Applicable.    Concerns: No Concerns.    Procedure:  Lab draw site: left antecub, Needle type: butterfly, Gauge: 23.    Post Assessment:  Labs drawn without difficulty: Yes.    Discharge Plan:  Departure Mode: Ambulatory.    Face to Face Time: 10.    Karin Arora, CMA

## 2023-05-05 ENCOUNTER — ONCOLOGY VISIT (OUTPATIENT)
Dept: ONCOLOGY | Facility: CLINIC | Age: 73
End: 2023-05-05
Attending: INTERNAL MEDICINE
Payer: COMMERCIAL

## 2023-05-05 VITALS
DIASTOLIC BLOOD PRESSURE: 68 MMHG | HEART RATE: 75 BPM | BODY MASS INDEX: 19.63 KG/M2 | TEMPERATURE: 97 F | RESPIRATION RATE: 16 BRPM | OXYGEN SATURATION: 98 % | HEIGHT: 64 IN | WEIGHT: 115 LBS | SYSTOLIC BLOOD PRESSURE: 128 MMHG

## 2023-05-05 DIAGNOSIS — D50.8 OTHER IRON DEFICIENCY ANEMIA: ICD-10-CM

## 2023-05-05 PROCEDURE — 99212 OFFICE O/P EST SF 10 MIN: CPT | Performed by: INTERNAL MEDICINE

## 2023-05-05 PROCEDURE — 99213 OFFICE O/P EST LOW 20 MIN: CPT | Performed by: INTERNAL MEDICINE

## 2023-05-05 ASSESSMENT — PAIN SCALES - GENERAL: PAINLEVEL: NO PAIN (0)

## 2023-05-05 NOTE — PROGRESS NOTES
[facility]    Hematology/Oncology Established Patient Note      Today's Date: 23    Reason for Consultation: Low hemoglobin  Referring Provider: Esteban Mejia MD, Tim Osman MD      HISTORY OF PRESENT ILLNESS: Beena De Souza is a  postemenopausal 73 year old female who presents with iron deficiency anemia. Past medical history is significant for HLD, asthma, and IBS.    Patient has been donating blood once monthly for a long while. She was then told she had elevated platelets and began to donate platelets only. Finally, she states she was told she was anemic and was no longer able to donate blood products.  Last blood donation was approximately 2022.     She had noted decreased strength and energy. Workup in 2022 had returned with ferritin 13 and hemoglobin 11.9.    Patient denies evidence of gross bleed in urine, stools, or vaginal bleeding. She denies early satiety, abdominal bloating/pain.     She has been on oral iron once daily since July and has noted increased energy levels.     No personal history of malignancy. No FamHx of malignancy.    She has remote social smoking history in her 30s. No alcohol or illicit drug use. She is retired and lives at home with her  and puppy and is able to perform all ADLs without issue.    Cancer screening: Colonoscopies UTD, last 2-3 years. She has had polyp removal. Mammograms are UTD; she had a right breast biopsy >10 years, benign. Pap smears UTD- no abnormals.       INTERIM HISTORY:  No acute events. Patient [place] She enjoyed thrift shopping and clearing trees.     She denies unintentional weight loss, gross evidence of bleed, fever, LAD.       REVIEW OF SYSTEMS:   A 14 point ROS was reviewed with pertinent positives and negatives in the HPI.        HOME MEDICATIONS:  Current Outpatient Medications   Medication Sig Dispense Refill     atorvastatin (LIPITOR) 10 MG  "tablet Take 10 mg by mouth daily       busPIRone (BUSPAR) 10 MG tablet        FLUoxetine (PROZAC) 10 MG tablet Take 20 mg by mouth daily       traZODone (DESYREL) 50 MG tablet            ALLERGIES:  Allergies   Allergen Reactions     Fluticasone Rash         PAST MEDICAL HISTORY:  Past medical history is significant for HLD, asthma, and IBS.      PAST SURGICAL HISTORY:  Colonoscopies, breast biopsy.    SOCIAL HISTORY:  Social History     Socioeconomic History     Marital status:      Spouse name: Not on file     Number of children: Not on file     Years of education: Not on file     Highest education level: Not on file   Occupational History     Not on file   Tobacco Use     Smoking status: Former     Types: Cigarettes     Smokeless tobacco: Never   Vaping Use     Vaping status: Not on file   Substance and Sexual Activity     Alcohol use: Not on file     Comment: social     Drug use: Not on file     Sexual activity: Not on file   Other Topics Concern     Not on file   Social History Narrative     Not on file     Social Determinants of Health     Financial Resource Strain: Not on file   Food Insecurity: Not on file   Transportation Needs: Not on file   Physical Activity: Not on file   Stress: Not on file   Social Connections: Not on file   Intimate Partner Violence: Not At Risk (2022)    Humiliation, Afraid, Rape, and Kick questionnaire      Fear of Current or Ex-Partner: No      Emotionally Abused: No      Physically Abused: No      Sexually Abused: No   Housing Stability: Not on file         FAMILY HISTORY:  Father: CAD, CVA.  No malignancy.    PHYSICAL EXAM:  Vital signs:  /68 (Cuff Size: Adult Regular)   Pulse 75   Temp 97  F (36.1  C) (Tympanic)   Resp 16   Ht 1.613 m (5' 3.5\")   Wt 52.2 kg (115 lb)   SpO2 98%   BMI 20.05 kg/m     ECO  GENERAL/CONSTITUTIONAL: No acute distress. Thin.  EYES: Pupils are equal, round, and react to light and accommodation. Extraocular movements intact.  " No scleral icterus.  ENT/MOUTH: Neck supple. Oropharynx clear, no mucositis.  LYMPH: No anterior cervical, posterior cervical, supraclavicular, axillary or inguinal adenopathy.   RESPIRATORY: Clear to auscultation bilaterally. No crackles or wheezing.   CARDIOVASCULAR: Regular rate and rhythm without murmurs, gallops, or rubs.  GASTROINTESTINAL: No hepatosplenomegaly, masses, or tenderness. The patient has normal bowel sounds. No guarding.  No distention.  MUSCULOSKELETAL: Warm and well-perfused, no cyanosis, clubbing, or edema.  NEUROLOGIC: Cranial nerves II-XII are intact. Alert, oriented, answers questions appropriately.  INTEGUMENTARY: No rashes or jaundice.  GAIT: Steady, does not use assistive device.      LABS:   Latest Reference Range & Units 04/14/23 10:32   Ferritin 11 - 328 ng/mL 82   Iron 37 - 145 ug/dL 95   Iron Binding Capacity 240 - 430 ug/dL 252   Iron Sat Index 15 - 46 % 38   WBC 4.0 - 11.0 10e3/uL 5.7   Hemoglobin 11.7 - 15.7 g/dL 14.0   Hematocrit 35.0 - 47.0 % 42.7   Platelet Count 150 - 450 10e3/uL 163   RBC Count 3.80 - 5.20 10e6/uL 4.73   MCV 78 - 100 fL 90   MCH 26.5 - 33.0 pg 29.6   MCHC 31.5 - 36.5 g/dL 32.8   RDW 10.0 - 15.0 % 13.1   % Neutrophils % 67   % Lymphocytes % 24   % Monocytes % 5   % Eosinophils % 3   % Basophils % 1   Absolute Basophils 0.0 - 0.2 10e3/uL 0.1   Absolute Eosinophils 0.0 - 0.7 10e3/uL 0.2   Absolute Immature Granulocytes <=0.4 10e3/uL 0.0   Absolute Lymphocytes 0.8 - 5.3 10e3/uL 1.3   Absolute Monocytes 0.0 - 1.3 10e3/uL 0.3   % Immature Granulocytes % 0   Absolute Neutrophils 1.6 - 8.3 10e3/uL 3.8   Absolute NRBCs 10e3/uL 0.0   NRBCs per 100 WBC <1 /100 0       Final Diagnosis 9/23/22:   Peripheral blood:  -- No morphologic abnormalities.    Electronically signed by Patrick Navas MD on 9/27/2022 at 12:51 PM   Clinical Information    Anemia      Peripheral Smear    The red blood cells appear normochromic.  There is no increase in rouleaux formation or  polychromasia.  Poikilocytosis appears mild and nonspecific.  Platelets appear predominantly small, well granulated, and lack significant clumping or satellitosis.  Lymphocytes are predominantly small with cytologically mature chromatin and appear overall polymorphous.  Neutrophils contain normal cytoplasmic granulation and unremarkable nuclear morphology.  There is no dysplasia and no circulating blasts are seen.     Findings 3/5/2018:      The perianal and digital rectal examinations were normal.      The colon (entire examined portion) appeared normal.      Retroflexion done in the right colon.      Non-bleeding external and internal hemorrhoids were found during       retroflexion. The hemorrhoids were medium-sized.  Moderate Sedation:      Moderate (conscious) sedation was personally administered by the       endoscopist. The following parameters were monitored: oxygen       saturation, heart rate, blood pressure, and response to care. Total       physician intraservice time was 10 minutes.  Impression:          - The entire examined colon is normal.                      - Retroflexion done in the right colon.                      - Non-bleeding external and internal hemorrhoids.                      - No specimens collected.  Recommendation:      - Repeat colonoscopy in 10 years for screening                       purposes.      ASSESSMENT/PLAN:  Beena De Souza is a  postemenopausal 72 year old female who presents with iron deficiency anemia. Past medical history is significant for HLD, asthma, and IBS.    1) Iron deficiency anemia  -Patient denies gross evidence of bleed. She reports colonoscopy is UTD and last done in 2018 (Dr. Milly Sanchez). Given the fact that she does have evidence of iron deficiency that responded to oral supplementation, there should be strong consideration for updating GI workup at this time (EGD/colonoscopy). However, hemoglobin has improved to 14 and ferritin is up to 82  currently.  -She continues on once daily iron tablet as well as VitC. Okay to continue.   -She is recommended to hold from blood donation at this time.    2) History of HLD, asthma, IBS  -Followed by PCP.     3) CKD  -SPIEP testing done at outside institution was negative for M-protein.     4) Follow up in 6 months with repeat CBC and iron stores to monitor for need for IV iron/etc.         Darlene Carpio DO  Hematology/Oncology  [facility]

## 2023-05-05 NOTE — LETTER
2023         RE: Beena De Souza  [address] [facility] [address]        Dear Colleague,    Thank you for referring your patient, Beena De Souza, to the [facility]. Please see a copy of my visit note below.    [facility]    Hematology/Oncology Established Patient Note      Today's Date: 23    Reason for Consultation: Low hemoglobin  Referring Provider: Esteban Mejia MD, Tim Osman MD      HISTORY OF PRESENT ILLNESS: Beena De Souza is a  postemenopausal 73 year old female who presents with iron deficiency anemia. Past medical history is significant for HLD, asthma, and IBS.    Patient has been donating blood once monthly for a long while. She was then told she had elevated platelets and began to donate platelets only. Finally, she states she was told she was anemic and was no longer able to donate blood products.  Last blood donation was approximately 2022.     She had noted decreased strength and energy. Workup in 2022 had returned with ferritin 13 and hemoglobin 11.9.    Patient denies evidence of gross bleed in urine, stools, or vaginal bleeding. She denies early satiety, abdominal bloating/pain.     She has been on oral iron once daily since July and has noted increased energy levels.     No personal history of malignancy. No FamHx of malignancy.    She has remote social smoking history in her 30s. No alcohol or illicit drug use. She is retired and lives at home with her  and puppy and is able to perform all ADLs without issue.    Cancer screening: Colonoscopies UTD, last 2-3 years. She has had polyp removal. Mammograms are UTD; she had a right breast biopsy >10 years, benign. Pap smears UTD- no abnormals.       INTERIM HISTORY:  No acute events. Patient recently spent some time up [place] visiting friends. She enjoyed thrift shopping and clearing trees.     She denies unintentional weight loss, gross evidence  of bleed, fever, LAD.       REVIEW OF SYSTEMS:   A 14 point ROS was reviewed with pertinent positives and negatives in the HPI.        HOME MEDICATIONS:  Current Outpatient Medications   Medication Sig Dispense Refill     atorvastatin (LIPITOR) 10 MG tablet Take 10 mg by mouth daily       busPIRone (BUSPAR) 10 MG tablet        FLUoxetine (PROZAC) 10 MG tablet Take 20 mg by mouth daily       traZODone (DESYREL) 50 MG tablet            ALLERGIES:  Allergies   Allergen Reactions     Fluticasone Rash         PAST MEDICAL HISTORY:  Past medical history is significant for HLD, asthma, and IBS.      PAST SURGICAL HISTORY:  Colonoscopies, breast biopsy.    SOCIAL HISTORY:  Social History     Socioeconomic History     Marital status:      Spouse name: Not on file     Number of children: Not on file     Years of education: Not on file     Highest education level: Not on file   Occupational History     Not on file   Tobacco Use     Smoking status: Former     Types: Cigarettes     Smokeless tobacco: Never   Vaping Use     Vaping status: Not on file   Substance and Sexual Activity     Alcohol use: Not on file     Comment: social     Drug use: Not on file     Sexual activity: Not on file   Other Topics Concern     Not on file   Social History Narrative     Not on file     Social Determinants of Health     Financial Resource Strain: Not on file   Food Insecurity: Not on file   Transportation Needs: Not on file   Physical Activity: Not on file   Stress: Not on file   Social Connections: Not on file   Intimate Partner Violence: Not At Risk (9/23/2022)    Humiliation, Afraid, Rape, and Kick questionnaire      Fear of Current or Ex-Partner: No      Emotionally Abused: No      Physically Abused: No      Sexually Abused: No   Housing Stability: Not on file         FAMILY HISTORY:  Father: CAD, CVA.  No malignancy.    PHYSICAL EXAM:  Vital signs:  /68 (Cuff Size: Adult Regular)   Pulse 75   Temp 97  F (36.1  C) (Tympanic)   " Resp 16   Ht 1.613 m (5' 3.5\")   Wt 52.2 kg (115 lb)   SpO2 98%   BMI 20.05 kg/m     ECO  GENERAL/CONSTITUTIONAL: No acute distress. Thin.  EYES: Pupils are equal, round, and react to light and accommodation. Extraocular movements intact.  No scleral icterus.  ENT/MOUTH: Neck supple. Oropharynx clear, no mucositis.  LYMPH: No anterior cervical, posterior cervical, supraclavicular, axillary or inguinal adenopathy.   RESPIRATORY: Clear to auscultation bilaterally. No crackles or wheezing.   CARDIOVASCULAR: Regular rate and rhythm without murmurs, gallops, or rubs.  GASTROINTESTINAL: No hepatosplenomegaly, masses, or tenderness. The patient has normal bowel sounds. No guarding.  No distention.  MUSCULOSKELETAL: Warm and well-perfused, no cyanosis, clubbing, or edema.  NEUROLOGIC: Cranial nerves II-XII are intact. Alert, oriented, answers questions appropriately.  INTEGUMENTARY: No rashes or jaundice.  GAIT: Steady, does not use assistive device.      LABS:   Latest Reference Range & Units 23 10:32   Ferritin 11 - 328 ng/mL 82   Iron 37 - 145 ug/dL 95   Iron Binding Capacity 240 - 430 ug/dL 252   Iron Sat Index 15 - 46 % 38   WBC 4.0 - 11.0 10e3/uL 5.7   Hemoglobin 11.7 - 15.7 g/dL 14.0   Hematocrit 35.0 - 47.0 % 42.7   Platelet Count 150 - 450 10e3/uL 163   RBC Count 3.80 - 5.20 10e6/uL 4.73   MCV 78 - 100 fL 90   MCH 26.5 - 33.0 pg 29.6   MCHC 31.5 - 36.5 g/dL 32.8   RDW 10.0 - 15.0 % 13.1   % Neutrophils % 67   % Lymphocytes % 24   % Monocytes % 5   % Eosinophils % 3   % Basophils % 1   Absolute Basophils 0.0 - 0.2 10e3/uL 0.1   Absolute Eosinophils 0.0 - 0.7 10e3/uL 0.2   Absolute Immature Granulocytes <=0.4 10e3/uL 0.0   Absolute Lymphocytes 0.8 - 5.3 10e3/uL 1.3   Absolute Monocytes 0.0 - 1.3 10e3/uL 0.3   % Immature Granulocytes % 0   Absolute Neutrophils 1.6 - 8.3 10e3/uL 3.8   Absolute NRBCs 10e3/uL 0.0   NRBCs per 100 WBC <1 /100 0       Final Diagnosis 22:   Peripheral blood:  -- No " morphologic abnormalities.    Electronically signed by Patrick Navas MD on 2022 at 12:51 PM   Clinical Information    Anemia      Peripheral Smear    The red blood cells appear normochromic.  There is no increase in rouleaux formation or polychromasia.  Poikilocytosis appears mild and nonspecific.  Platelets appear predominantly small, well granulated, and lack significant clumping or satellitosis.  Lymphocytes are predominantly small with cytologically mature chromatin and appear overall polymorphous.  Neutrophils contain normal cytoplasmic granulation and unremarkable nuclear morphology.  There is no dysplasia and no circulating blasts are seen.     Findings 3/5/2018:      The perianal and digital rectal examinations were normal.      The colon (entire examined portion) appeared normal.      Retroflexion done in the right colon.      Non-bleeding external and internal hemorrhoids were found during       retroflexion. The hemorrhoids were medium-sized.  Moderate Sedation:      Moderate (conscious) sedation was personally administered by the       endoscopist. The following parameters were monitored: oxygen       saturation, heart rate, blood pressure, and response to care. Total       physician intraservice time was 10 minutes.  Impression:          - The entire examined colon is normal.                      - Retroflexion done in the right colon.                      - Non-bleeding external and internal hemorrhoids.                      - No specimens collected.  Recommendation:      - Repeat colonoscopy in 10 years for screening                       purposes.      ASSESSMENT/PLAN:  Beena De Souza is a  postemenopausal 72 year old female who presents with iron deficiency anemia. Past medical history is significant for HLD, asthma, and IBS.    1) Iron deficiency anemia  -Patient denies gross evidence of bleed. She reports colonoscopy is UTD and last done in 2018 (Dr. Milly Sanchez). Given the fact that  she does have evidence of iron deficiency that responded to oral supplementation, there should be strong consideration for updating GI workup at this time (EGD/colonoscopy). However, hemoglobin has improved to 14 and ferritin is up to 82 currently.  -She continues on once daily iron tablet as well as VitC. Okay to continue.   -She is recommended to hold from blood donation at this time.    2) History of HLD, asthma, IBS  -Followed by PCP.     3) CKD  -SPIEP testing done at outside institution was negative for M-protein.     4) Follow up in 6 months with repeat CBC and iron stores to monitor for need for IV iron/etc.         Darlene Carpio DO  Hematology/Oncology  [facility]        Again, thank you for allowing me to participate in the care of your patient.        Sincerely,        Darlene Carpio DO

## 2023-05-06 ENCOUNTER — PATIENT OUTREACH (OUTPATIENT)
Dept: ONCOLOGY | Facility: CLINIC | Age: 73
End: 2023-05-06
Payer: COMMERCIAL

## 2023-05-06 NOTE — PROGRESS NOTES
Patient no - showed appointment with Dr. Carpio on 04/18/23.    Malina Solis RN on 5/6/2023 at 6:28 PM

## 2023-10-30 ENCOUNTER — LAB (OUTPATIENT)
Dept: INFUSION THERAPY | Facility: CLINIC | Age: 73
End: 2023-10-30
Attending: INTERNAL MEDICINE
Payer: COMMERCIAL

## 2023-10-30 DIAGNOSIS — D50.8 OTHER IRON DEFICIENCY ANEMIA: ICD-10-CM

## 2023-10-30 LAB
BASOPHILS # BLD AUTO: 0 10E3/UL (ref 0–0.2)
BASOPHILS NFR BLD AUTO: 1 %
EOSINOPHIL # BLD AUTO: 0.3 10E3/UL (ref 0–0.7)
EOSINOPHIL NFR BLD AUTO: 4 %
ERYTHROCYTE [DISTWIDTH] IN BLOOD BY AUTOMATED COUNT: 12.8 % (ref 10–15)
FERRITIN SERPL-MCNC: 146 NG/ML (ref 11–328)
HCT VFR BLD AUTO: 39.5 % (ref 35–47)
HGB BLD-MCNC: 13.3 G/DL (ref 11.7–15.7)
IMM GRANULOCYTES # BLD: 0 10E3/UL
IMM GRANULOCYTES NFR BLD: 0 %
IRON BINDING CAPACITY (ROCHE): 248 UG/DL (ref 240–430)
IRON SATN MFR SERPL: 32 % (ref 15–46)
IRON SERPL-MCNC: 79 UG/DL (ref 37–145)
LYMPHOCYTES # BLD AUTO: 1.3 10E3/UL (ref 0.8–5.3)
LYMPHOCYTES NFR BLD AUTO: 24 %
MCH RBC QN AUTO: 30.4 PG (ref 26.5–33)
MCHC RBC AUTO-ENTMCNC: 33.7 G/DL (ref 31.5–36.5)
MCV RBC AUTO: 90 FL (ref 78–100)
MONOCYTES # BLD AUTO: 0.3 10E3/UL (ref 0–1.3)
MONOCYTES NFR BLD AUTO: 5 %
NEUTROPHILS # BLD AUTO: 3.7 10E3/UL (ref 1.6–8.3)
NEUTROPHILS NFR BLD AUTO: 66 %
NRBC # BLD AUTO: 0 10E3/UL
NRBC BLD AUTO-RTO: 0 /100
PLATELET # BLD AUTO: 154 10E3/UL (ref 150–450)
RBC # BLD AUTO: 4.38 10E6/UL (ref 3.8–5.2)
WBC # BLD AUTO: 5.6 10E3/UL (ref 4–11)

## 2023-10-30 PROCEDURE — 36415 COLL VENOUS BLD VENIPUNCTURE: CPT

## 2023-10-30 PROCEDURE — 83550 IRON BINDING TEST: CPT | Performed by: INTERNAL MEDICINE

## 2023-10-30 PROCEDURE — 82728 ASSAY OF FERRITIN: CPT | Performed by: INTERNAL MEDICINE

## 2023-10-30 PROCEDURE — 85025 COMPLETE CBC W/AUTO DIFF WBC: CPT | Performed by: INTERNAL MEDICINE

## 2023-10-30 NOTE — PROGRESS NOTES
Nursing Note:  Beena De Souza presents today for labs.    Patient seen by provider today: No   present during visit today: Not Applicable.    Note: N/A.    Intravenous Access:  Lab draw site LAC, Needle type butterfly, Gauge 23.  Labs drawn without difficulty.    Discharge Plan:   Patient was discharged home.    ZACHARY HERR RN

## 2023-12-28 ENCOUNTER — ONCOLOGY VISIT (OUTPATIENT)
Dept: ONCOLOGY | Facility: CLINIC | Age: 73
End: 2023-12-28
Attending: INTERNAL MEDICINE
Payer: COMMERCIAL

## 2023-12-28 VITALS
SYSTOLIC BLOOD PRESSURE: 134 MMHG | WEIGHT: 116.9 LBS | RESPIRATION RATE: 18 BRPM | HEIGHT: 64 IN | TEMPERATURE: 98.7 F | DIASTOLIC BLOOD PRESSURE: 77 MMHG | HEART RATE: 74 BPM | OXYGEN SATURATION: 98 % | BODY MASS INDEX: 19.96 KG/M2

## 2023-12-28 DIAGNOSIS — D50.8 OTHER IRON DEFICIENCY ANEMIA: ICD-10-CM

## 2023-12-28 PROCEDURE — 99213 OFFICE O/P EST LOW 20 MIN: CPT | Performed by: INTERNAL MEDICINE

## 2023-12-28 ASSESSMENT — PAIN SCALES - GENERAL: PAINLEVEL: NO PAIN (0)

## 2023-12-28 NOTE — LETTER
2023         RE: Beena eD Souza  [address] [facility] [address]        Dear Colleague,    Thank you for referring your patient, Beena De Souza, to the [facility]. Please see a copy of my visit note below.    [facility]    Hematology/Oncology Established Patient Note      Today's Date: 23    Reason for Consultation: Low hemoglobin  Referring Provider: Esteban Mejia MD, Tim Osman MD      HISTORY OF PRESENT ILLNESS: Beena De Souza is a  postemenopausal 73 year old female who presents with iron deficiency anemia. Past medical history is significant for HLD, asthma, and IBS.    Patient has been donating blood once monthly for a long while. She was then told she had elevated platelets and began to donate platelets only. Finally, she states she was told she was anemic and was no longer able to donate blood products.  Last blood donation was approximately 2022.     She had noted decreased strength and energy. Workup in 2022 had returned with ferritin 13 and hemoglobin 11.9.    Patient denies evidence of gross bleed in urine, stools, or vaginal bleeding. She denies early satiety, abdominal bloating/pain.     She has been on oral iron once daily since July and has noted increased energy levels.     No personal history of malignancy. No FamHx of malignancy.    She has remote social smoking history in her 30s. No alcohol or illicit drug use. She is retired and lives at home with her  and puppy and is able to perform all ADLs without issue.    Cancer screening: Colonoscopies UTD, last 2-3 years. She has had polyp removal. Mammograms are UTD; she had a right breast biopsy >10 years, benign. Pap smears UTD- no abnormals.       INTERIM HISTORY:  She has been diagnosed with Alzheimer's. She is still able to do all ADLs, but has difficulty with addresses.      REVIEW OF SYSTEMS:   A 14 point ROS was reviewed with pertinent  "positives and negatives in the HPI.        HOME MEDICATIONS:  Current Outpatient Medications   Medication Sig Dispense Refill     atorvastatin (LIPITOR) 10 MG tablet Take 10 mg by mouth daily       busPIRone (BUSPAR) 10 MG tablet        FLUoxetine (PROZAC) 10 MG tablet Take 20 mg by mouth daily       traZODone (DESYREL) 50 MG tablet            ALLERGIES:  Allergies   Allergen Reactions     Fluticasone Rash         PAST MEDICAL HISTORY:  Past medical history is significant for HLD, asthma, and IBS.      PAST SURGICAL HISTORY:  Colonoscopies, breast biopsy.    SOCIAL HISTORY:  Social History     Socioeconomic History     Marital status:      Spouse name: Not on file     Number of children: Not on file     Years of education: Not on file     Highest education level: Not on file   Occupational History     Not on file   Tobacco Use     Smoking status: Former     Types: Cigarettes     Smokeless tobacco: Never   Substance and Sexual Activity     Alcohol use: Not on file     Comment: social     Drug use: Not on file     Sexual activity: Not on file   Other Topics Concern     Not on file   Social History Narrative     Not on file     Social Determinants of Health     Financial Resource Strain: Not on file   Food Insecurity: Not on file   Transportation Needs: Not on file   Physical Activity: Not on file   Stress: Not on file   Social Connections: Not on file   Interpersonal Safety: Not At Risk (2022)    Humiliation, Afraid, Rape, and Kick questionnaire      Fear of Current or Ex-Partner: No      Emotionally Abused: No      Physically Abused: No      Sexually Abused: No   Housing Stability: Not on file         FAMILY HISTORY:  Father: CAD, CVA.  No malignancy.    PHYSICAL EXAM:  Vital signs:  /77   Pulse 74   Temp 98.7  F (37.1  C) (Oral)   Resp 18   Ht 1.613 m (5' 3.5\")   Wt 53 kg (116 lb 14.4 oz)   SpO2 98%   BMI 20.38 kg/m     ECO  GENERAL/CONSTITUTIONAL: No acute distress. Thin.  EYES: Pupils " are equal, round, and react to light and accommodation. Extraocular movements intact.  No scleral icterus.  MUSCULOSKELETAL: Warm and well-perfused, no cyanosis, clubbing, or edema.  NEUROLOGIC: Cranial nerves II-XII are intact. Alert, oriented, answers questions appropriately.  INTEGUMENTARY: No rashes or jaundice.  GAIT: Steady, does not use assistive device.      LABS: Reviewed with patient today.   Latest Reference Range & Units 10/30/23 13:06   Ferritin 11 - 328 ng/mL 146   Iron 37 - 145 ug/dL 79   Iron Binding Capacity 240 - 430 ug/dL 248   Iron Sat Index 15 - 46 % 32   WBC 4.0 - 11.0 10e3/uL 5.6   Hemoglobin 11.7 - 15.7 g/dL 13.3   Hematocrit 35.0 - 47.0 % 39.5   Platelet Count 150 - 450 10e3/uL 154   RBC Count 3.80 - 5.20 10e6/uL 4.38   MCV 78 - 100 fL 90   MCH 26.5 - 33.0 pg 30.4   MCHC 31.5 - 36.5 g/dL 33.7   RDW 10.0 - 15.0 % 12.8   % Neutrophils % 66   % Lymphocytes % 24   % Monocytes % 5   % Eosinophils % 4   % Basophils % 1   Absolute Basophils 0.0 - 0.2 10e3/uL 0.0   Absolute Eosinophils 0.0 - 0.7 10e3/uL 0.3   Absolute Immature Granulocytes <=0.4 10e3/uL 0.0   Absolute Lymphocytes 0.8 - 5.3 10e3/uL 1.3   Absolute Monocytes 0.0 - 1.3 10e3/uL 0.3   % Immature Granulocytes % 0   Absolute Neutrophils 1.6 - 8.3 10e3/uL 3.7   Absolute NRBCs 10e3/uL 0.0   NRBCs per 100 WBC <1 /100 0       Final Diagnosis 9/23/22:   Peripheral blood:  -- No morphologic abnormalities.    Electronically signed by Patrick Navas MD on 9/27/2022 at 12:51 PM   Clinical Information    Anemia      Peripheral Smear    The red blood cells appear normochromic.  There is no increase in rouleaux formation or polychromasia.  Poikilocytosis appears mild and nonspecific.  Platelets appear predominantly small, well granulated, and lack significant clumping or satellitosis.  Lymphocytes are predominantly small with cytologically mature chromatin and appear overall polymorphous.  Neutrophils contain normal cytoplasmic granulation and  unremarkable nuclear morphology.  There is no dysplasia and no circulating blasts are seen.     Findings 3/5/2018:      The perianal and digital rectal examinations were normal.      The colon (entire examined portion) appeared normal.      Retroflexion done in the right colon.      Non-bleeding external and internal hemorrhoids were found during       retroflexion. The hemorrhoids were medium-sized.  Moderate Sedation:      Moderate (conscious) sedation was personally administered by the       endoscopist. The following parameters were monitored: oxygen       saturation, heart rate, blood pressure, and response to care. Total       physician intraservice time was 10 minutes.  Impression:          - The entire examined colon is normal.                      - Retroflexion done in the right colon.                      - Non-bleeding external and internal hemorrhoids.                      - No specimens collected.  Recommendation:      - Repeat colonoscopy in 10 years for screening                       purposes.      ASSESSMENT/PLAN:  Beena De Souza is a  postemenopausal 73 year old female who presents with iron deficiency anemia. Past medical history is significant for HLD, asthma, and IBS.    1) Iron deficiency anemia  -Patient denies gross evidence of bleed. She reports colonoscopy is UTD and last done in 2018 (Dr. Milly Sanchez). Given the fact that she does have evidence of iron deficiency that responded to oral supplementation, there should be strong consideration for updating GI workup at this time (EGD/colonoscopy). However, hemoglobin has improved to 14 and ferritin is up to 82 currently.  -She continues on once daily iron tablet as well as VitC. Okay to continue.   -CBC and iron studies remain stable and normal.  -She is recommended to hold from blood donation at this time.    2) History of HLD, asthma, IBS  -Followed by PCP.     3) CKD  -SPIEP testing done at outside institution was negative for  M-protein.     4) Recent diagnosis of Alzheimer's    5) Patient will follow with PCP given stability of CBC. She is encouraged to contact clinic in the future with any CBC concerns.         Darlene Carpio DO  Hematology/Oncology  [facility]      Again, thank you for allowing me to participate in the care of your patient.        Sincerely,        Darlene Carpio DO

## 2023-12-28 NOTE — PROGRESS NOTES
[facility]    Hematology/Oncology Established Patient Note      Today's Date: 23    Reason for Consultation: Low hemoglobin  Referring Provider: Esteban Mejia MD, Tim Osman MD      HISTORY OF PRESENT ILLNESS: Beena De Souza is a  postemenopausal 73 year old female who presents with iron deficiency anemia. Past medical history is significant for HLD, asthma, and IBS.    Patient has been donating blood once monthly for a long while. She was then told she had elevated platelets and began to donate platelets only. Finally, she states she was told she was anemic and was no longer able to donate blood products.  Last blood donation was approximately 2022.     She had noted decreased strength and energy. Workup in 2022 had returned with ferritin 13 and hemoglobin 11.9.    Patient denies evidence of gross bleed in urine, stools, or vaginal bleeding. She denies early satiety, abdominal bloating/pain.     She has been on oral iron once daily since July and has noted increased energy levels.     No personal history of malignancy. No FamHx of malignancy.    She has remote social smoking history in her 30s. No alcohol or illicit drug use. She is retired and lives at home with her  and puppy and is able to perform all ADLs without issue.    Cancer screening: Colonoscopies UTD, last 2-3 years. She has had polyp removal. Mammograms are UTD; she had a right breast biopsy >10 years, benign. Pap smears UTD- no abnormals.       INTERIM HISTORY:  She has been diagnosed with Alzheimer's. She is still able to do all ADLs, but has difficulty with addresses.      REVIEW OF SYSTEMS:   A 14 point ROS was reviewed with pertinent positives and negatives in the HPI.        HOME MEDICATIONS:  Current Outpatient Medications   Medication Sig Dispense Refill    atorvastatin (LIPITOR) 10 MG tablet Take 10 mg by mouth daily      busPIRone (BUSPAR) 10 MG tablet       FLUoxetine (PROZAC) 10  "MG tablet Take 20 mg by mouth daily      traZODone (DESYREL) 50 MG tablet            ALLERGIES:  Allergies   Allergen Reactions    Fluticasone Rash         PAST MEDICAL HISTORY:  Past medical history is significant for HLD, asthma, and IBS.      PAST SURGICAL HISTORY:  Colonoscopies, breast biopsy.    SOCIAL HISTORY:  Social History     Socioeconomic History    Marital status:      Spouse name: Not on file    Number of children: Not on file    Years of education: Not on file    Highest education level: Not on file   Occupational History    Not on file   Tobacco Use    Smoking status: Former     Types: Cigarettes    Smokeless tobacco: Never   Substance and Sexual Activity    Alcohol use: Not on file     Comment: social    Drug use: Not on file    Sexual activity: Not on file   Other Topics Concern    Not on file   Social History Narrative    Not on file     Social Determinants of Health     Financial Resource Strain: Not on file   Food Insecurity: Not on file   Transportation Needs: Not on file   Physical Activity: Not on file   Stress: Not on file   Social Connections: Not on file   Interpersonal Safety: Not At Risk (2022)    Humiliation, Afraid, Rape, and Kick questionnaire     Fear of Current or Ex-Partner: No     Emotionally Abused: No     Physically Abused: No     Sexually Abused: No   Housing Stability: Not on file         FAMILY HISTORY:  Father: CAD, CVA.  No malignancy.    PHYSICAL EXAM:  Vital signs:  /77   Pulse 74   Temp 98.7  F (37.1  C) (Oral)   Resp 18   Ht 1.613 m (5' 3.5\")   Wt 53 kg (116 lb 14.4 oz)   SpO2 98%   BMI 20.38 kg/m     ECO  GENERAL/CONSTITUTIONAL: No acute distress. Thin.  EYES: Pupils are equal, round, and react to light and accommodation. Extraocular movements intact.  No scleral icterus.  MUSCULOSKELETAL: Warm and well-perfused, no cyanosis, clubbing, or edema.  NEUROLOGIC: Cranial nerves II-XII are intact. Alert, oriented, answers questions " appropriately.  INTEGUMENTARY: No rashes or jaundice.  GAIT: Steady, does not use assistive device.      LABS: Reviewed with patient today.   Latest Reference Range & Units 10/30/23 13:06   Ferritin 11 - 328 ng/mL 146   Iron 37 - 145 ug/dL 79   Iron Binding Capacity 240 - 430 ug/dL 248   Iron Sat Index 15 - 46 % 32   WBC 4.0 - 11.0 10e3/uL 5.6   Hemoglobin 11.7 - 15.7 g/dL 13.3   Hematocrit 35.0 - 47.0 % 39.5   Platelet Count 150 - 450 10e3/uL 154   RBC Count 3.80 - 5.20 10e6/uL 4.38   MCV 78 - 100 fL 90   MCH 26.5 - 33.0 pg 30.4   MCHC 31.5 - 36.5 g/dL 33.7   RDW 10.0 - 15.0 % 12.8   % Neutrophils % 66   % Lymphocytes % 24   % Monocytes % 5   % Eosinophils % 4   % Basophils % 1   Absolute Basophils 0.0 - 0.2 10e3/uL 0.0   Absolute Eosinophils 0.0 - 0.7 10e3/uL 0.3   Absolute Immature Granulocytes <=0.4 10e3/uL 0.0   Absolute Lymphocytes 0.8 - 5.3 10e3/uL 1.3   Absolute Monocytes 0.0 - 1.3 10e3/uL 0.3   % Immature Granulocytes % 0   Absolute Neutrophils 1.6 - 8.3 10e3/uL 3.7   Absolute NRBCs 10e3/uL 0.0   NRBCs per 100 WBC <1 /100 0       Final Diagnosis 9/23/22:   Peripheral blood:  -- No morphologic abnormalities.    Electronically signed by Patrick Navas MD on 9/27/2022 at 12:51 PM   Clinical Information    Anemia      Peripheral Smear    The red blood cells appear normochromic.  There is no increase in rouleaux formation or polychromasia.  Poikilocytosis appears mild and nonspecific.  Platelets appear predominantly small, well granulated, and lack significant clumping or satellitosis.  Lymphocytes are predominantly small with cytologically mature chromatin and appear overall polymorphous.  Neutrophils contain normal cytoplasmic granulation and unremarkable nuclear morphology.  There is no dysplasia and no circulating blasts are seen.     Findings 3/5/2018:      The perianal and digital rectal examinations were normal.      The colon (entire examined portion) appeared normal.      Retroflexion done in the right  colon.      Non-bleeding external and internal hemorrhoids were found during       retroflexion. The hemorrhoids were medium-sized.  Moderate Sedation:      Moderate (conscious) sedation was personally administered by the       endoscopist. The following parameters were monitored: oxygen       saturation, heart rate, blood pressure, and response to care. Total       physician intraservice time was 10 minutes.  Impression:          - The entire examined colon is normal.                      - Retroflexion done in the right colon.                      - Non-bleeding external and internal hemorrhoids.                      - No specimens collected.  Recommendation:      - Repeat colonoscopy in 10 years for screening                       purposes.      ASSESSMENT/PLAN:  Beena De Souza is a  postemenopausal 73 year old female who presents with iron deficiency anemia. Past medical history is significant for HLD, asthma, and IBS.    1) Iron deficiency anemia  -Patient denies gross evidence of bleed. She reports colonoscopy is UTD and last done in 2018 (Dr. Milly Sanchez). Given the fact that she does have evidence of iron deficiency that responded to oral supplementation, there should be strong consideration for updating GI workup at this time (EGD/colonoscopy). However, hemoglobin has improved to 14 and ferritin is up to 82 currently.  -She continues on once daily iron tablet as well as VitC. Okay to continue.   -CBC and iron studies remain stable and normal.  -She is recommended to hold from blood donation at this time.    2) History of HLD, asthma, IBS  -Followed by PCP.     3) CKD  -SPIEP testing done at outside institution was negative for M-protein.     4) Recent diagnosis of Alzheimer's    5) Patient will follow with PCP given stability of CBC. She is encouraged to contact clinic in the future with any CBC concerns.         Darlene Carpio,   Hematology/Oncology  [facility]

## 2023-12-28 NOTE — NURSING NOTE
"Oncology Rooming Note    December 28, 2023 2:40 PM   Beena De Souza is a 73 year old female who presents for:    Chief Complaint   Patient presents with    Oncology Clinic Visit     6 month follow up     Initial Vitals: /77   Pulse 74   Temp 98.7  F (37.1  C) (Oral)   Resp 18   Ht 1.613 m (5' 3.5\")   Wt 53 kg (116 lb 14.4 oz)   SpO2 98%   BMI 20.38 kg/m   Estimated body mass index is 20.38 kg/m  as calculated from the following:    Height as of this encounter: 1.613 m (5' 3.5\").    Weight as of this encounter: 53 kg (116 lb 14.4 oz). Body surface area is 1.54 meters squared.  No Pain (0) Comment: Data Unavailable   No LMP recorded. Patient is postmenopausal.  Allergies reviewed: Yes  Medications reviewed: Yes    Medications: Medication refills not needed today.  Pharmacy name entered into [employer]: [id number] - [place], [place] - [address]    Frailty Screening:   Is the patient here for a new oncology consult visit in cancer care? 2. No      Clinical concerns: follow up       Roseanna Quinones MA              "